# Patient Record
Sex: MALE | Race: WHITE | Employment: OTHER | URBAN - NONMETROPOLITAN AREA
[De-identification: names, ages, dates, MRNs, and addresses within clinical notes are randomized per-mention and may not be internally consistent; named-entity substitution may affect disease eponyms.]

---

## 2021-07-29 LAB
ALBUMIN SERPL-MCNC: 3.9 G/DL
ALP BLD-CCNC: 41 U/L
ALT SERPL-CCNC: 43 U/L
ANION GAP SERPL CALCULATED.3IONS-SCNC: NORMAL MMOL/L
AST SERPL-CCNC: 36 U/L
BASOPHILS ABSOLUTE: NORMAL
BASOPHILS RELATIVE PERCENT: NORMAL
BILIRUB SERPL-MCNC: 0.5 MG/DL (ref 0.1–1.4)
BUN BLDV-MCNC: 26 MG/DL
CALCIUM SERPL-MCNC: 9 MG/DL
CHLORIDE BLD-SCNC: 103 MMOL/L
CHOLESTEROL, TOTAL: 159 MG/DL
CHOLESTEROL/HDL RATIO: NORMAL
CO2: 24 MMOL/L
CREAT SERPL-MCNC: 1.4 MG/DL
EOSINOPHILS ABSOLUTE: NORMAL
EOSINOPHILS RELATIVE PERCENT: NORMAL
GFR CALCULATED: 53
GLUCOSE BLD-MCNC: 91 MG/DL
HCT VFR BLD CALC: 47.8 % (ref 41–53)
HDLC SERPL-MCNC: NORMAL MG/DL
HEMOGLOBIN: 16 G/DL (ref 13.5–17.5)
LDL CHOLESTEROL CALCULATED: 97 MG/DL (ref 0–160)
LYMPHOCYTES ABSOLUTE: NORMAL
LYMPHOCYTES RELATIVE PERCENT: NORMAL
MCH RBC QN AUTO: NORMAL PG
MCHC RBC AUTO-ENTMCNC: NORMAL G/DL
MCV RBC AUTO: NORMAL FL
MONOCYTES ABSOLUTE: NORMAL
MONOCYTES RELATIVE PERCENT: NORMAL
NEUTROPHILS ABSOLUTE: NORMAL
NEUTROPHILS RELATIVE PERCENT: NORMAL
NONHDLC SERPL-MCNC: NORMAL MG/DL
PLATELET # BLD: 231 K/ΜL
PMV BLD AUTO: NORMAL FL
POTASSIUM SERPL-SCNC: 4.3 MMOL/L
RBC # BLD: 5.26 10^6/ΜL
SODIUM BLD-SCNC: 137 MMOL/L
TOTAL PROTEIN: NORMAL
TRIGL SERPL-MCNC: 177 MG/DL
VLDLC SERPL CALC-MCNC: 35 MG/DL
WBC # BLD: 5.12 10^3/ML

## 2022-01-05 RX ORDER — FINASTERIDE 5 MG/1
5 TABLET, FILM COATED ORAL DAILY
COMMUNITY
Start: 2021-12-22

## 2022-05-06 ENCOUNTER — TELEPHONE (OUTPATIENT)
Dept: NEPHROLOGY | Age: 77
End: 2022-05-06

## 2022-05-06 ENCOUNTER — OFFICE VISIT (OUTPATIENT)
Dept: NEPHROLOGY | Age: 77
End: 2022-05-06
Payer: COMMERCIAL

## 2022-05-06 VITALS
SYSTOLIC BLOOD PRESSURE: 148 MMHG | WEIGHT: 175.5 LBS | HEART RATE: 97 BPM | DIASTOLIC BLOOD PRESSURE: 84 MMHG | OXYGEN SATURATION: 98 %

## 2022-05-06 DIAGNOSIS — N18.32 STAGE 3B CHRONIC KIDNEY DISEASE (HCC): Primary | ICD-10-CM

## 2022-05-06 DIAGNOSIS — I10 PRIMARY HYPERTENSION: ICD-10-CM

## 2022-05-06 PROCEDURE — 99203 OFFICE O/P NEW LOW 30 MIN: CPT | Performed by: INTERNAL MEDICINE

## 2022-05-06 RX ORDER — ACETAMINOPHEN 325 MG/1
650 TABLET ORAL 2 TIMES DAILY
COMMUNITY

## 2022-05-06 RX ORDER — GLUCOSAMINE SULFATE DIPOT CHLR 1000 MG
TABLET ORAL
COMMUNITY

## 2022-05-06 RX ORDER — DIMENHYDRINATE 50 MG
1000 TABLET ORAL DAILY
COMMUNITY

## 2022-05-06 RX ORDER — M-VIT,TX,IRON,MINS/CALC/FOLIC 27MG-0.4MG
1 TABLET ORAL DAILY
COMMUNITY

## 2022-05-06 RX ORDER — ANTIARTHRITIC COMBINATION NO.2 900 MG
2 TABLET ORAL DAILY
COMMUNITY

## 2022-05-06 RX ORDER — ASCORBIC ACID 500 MG
1000 TABLET ORAL DAILY
COMMUNITY

## 2022-05-06 RX ORDER — ACETAMINOPHEN/DIPHENHYDRAMINE 500MG-25MG
1 TABLET ORAL NIGHTLY
COMMUNITY

## 2022-05-06 NOTE — PROGRESS NOTES
Nephrology Consult Note  Patient's Name: Darlene Schroeder  10:23 AM  5/6/2022    Nephrologist: Sandra Minaya    Reason for Consult: Elevated serum creatinine level  Requesting Physician:  No att. providers found  PCP: Jeanna Theodore MD    Chief Complaint:  None  Assessment   Diagnosis Orders   1. Stage 3b chronic kidney disease (HCC)  Vitamin D 25 Hydroxy    PTH, Intact    Basic Metabolic Panel    US RENAL LIMITED    Protein / creatinine ratio, urine    Kappa / lambda light chains, urine, 24 hour   2. Primary hypertension           Plan    1. I discussed my with the patient and spouse. 2. They understood. 3. I had [place] questions. 4. I am not certain what it is gentleman has a high serum creatinine level. Discussed his blood pressure was also went on hypertensive nephrosclerosis. 5. Obstructive uropathy remains in the differential diagnosis because of his age. 6. Other consideration would include monoclonal gammopathy which is unlikely. 7. We will check random urine protein creatinine ratio  8. Check markers of chronicity  9. Kidney ultrasound to rule out hydronephrosis  10. Avoid nonsteroidal anti-inflammatory drugs and other nephrotoxic agents  11. List provided to the patient and spouse  15. I advised him to monitor his blood pressure at home twice a day mornings and evening for 5 days and call the office with a report. 13. Return visit in 4 weeks with labs      History Obtained From: Patient, spouse and electronic medical record  History of Present Ilness:    Darlene Schroeder is a 69 y.o. male with with history of a cataract, hernia with previous retinal surgery was referred to my office because of elevated serum creatinine of 1.4 mg/dL. However this was in July 2021. No previous labs. No recent labs either. He recently moved from the [facility] [place] to the [place] [facility] [place]. He never saw a nephrologist in [place]. So it is not certain whether he really has a chronic kidney disease or not.   He monitors his blood pressure at home. Running in the good range according to him. He is not on any blood pressure medicine. Blood pressure slightly here today in the office. No difficulties with urination. [place] No chest pain. No shortness of breath. No nausea or vomiting. No headaches. No abdominal pain. No fever or chills. He admits to hesitancy sometimes. No past medical history on file. Past Surgical History:   Procedure Laterality Date    CATARACT REMOVAL      HERNIA REPAIR         Family History   Problem Relation Age of Onset    Diabetes Brother     Hypertension Brother     Cancer Brother     Parkinson's Disease Brother     Cancer Maternal Aunt         reports that he has quit smoking. He has never used smokeless tobacco. He reports previous alcohol use. He reports that he does not use drugs. Allergies:  Patient has no known allergies. Current Medications:    Current Outpatient Medications   Medication Sig Dispense Refill    Multiple Vitamins-Minerals (THERAPEUTIC MULTIVITAMIN-MINERALS) tablet Take 1 tablet by mouth daily      vitamin C (ASCORBIC ACID) 500 MG tablet Take 1,000 mg by mouth daily      Flaxseed, Linseed, (FLAX SEED OIL) 1000 MG CAPS Take 1,000 mg by mouth daily      Methylsulfonylmethane (MSM) 1000 MG TABS Take by mouth      Biotin 5000 MCG TABS Take 2 tablets by mouth daily      acetaminophen (TYLENOL) 325 MG tablet Take 650 mg by mouth in the morning and at bedtime      diphenhydrAMINE-APAP, sleep, (TYLENOL PM EXTRA STRENGTH)  MG tablet Take 1 tablet by mouth at bedtime      finasteride (PROSCAR) 5 MG tablet Take 5 mg by mouth daily        No current facility-administered medications for this visit. No current facility-administered medications for this visit. Review of Systems:   Review of Systems   Pertinent positives stated above in HPI. All other systems were reviewed and were negative.   ROS: As in HPI    Physical exam:   Physical Exam   Constitutional: Well-developed elderly gentleman in no distress  Vitals:  Vitals:    05/06/22 1013   BP: (!) 148/84   Pulse: 97   SpO2: 98%        Skin: no rash, turgor is normal  Heent: Pupils are reactive to light. Throat is clear. Oral mucosa is moist.  Neck: no bruits or jvd noted   Cardiovascular:  S1, S2 without murmur   Respiratory: Clear with no wheezes,rhonchi or rales   Abdomen: soft,non tender,good bowel sound and no palpable mass  Ext: No lower extremity edema  Psychiatric: mood and affect appropriate  Musculoskeletal:  Rom, muscular strength intact   CNS: Very awake and very alert. Well-oriented. Normal speech. Good motor strength. No obvious focal deficit    Data:   Labs:  Lab Results   Component Value Date     07/29/2021    K 4.3 07/29/2021     07/29/2021    CO2 24 07/29/2021    CREATININE 1.4 07/29/2021    BUN 26 07/29/2021    GLUCOSE 91 07/29/2021    WBC 5.12 07/29/2021    HGB 16.0 07/29/2021    HCT 47.8 07/29/2021     07/29/2021     {Labs reviewed    Imaging:  CXR results: Thank you Dr. Zahra Telles MD for allowing us to participate in care of Nohelia Jesus   **This report has been created using voice recognition software. It maycontain minor  errors which are inherent in voice recognition technology. **    Electronically signed by Soo Soto MD on 5/6/2022 at 10:23 AM

## 2022-05-06 NOTE — PATIENT INSTRUCTIONS
Drugs to Avoid with Chronic Kidney Disease    Non-steroidal anti-inflammatory drugs (NSAIDS)    Potential complication and side effects of NSAIDS include:   Kidney injury and worsening kidney function    Risk of stomach ulcer and intestinal bleeding   Fluid retention and edema   Increased Blood Pressure    Non-Steroidal Anti-Inflammatory Drugs     Celecoxib (Celebrex)   Choline and magnesium salicylates (CMT, Trisosal, Trilisate)   Choline salicylate (Arthropan)   Diclofenac (Voltaren, Arthrotec, Cataflam, Cambia, Voltaren-XR, Zipsor)   Diflunisal (Dolobid)   Etodolac (Lodine XL, Lodine)   Famotidine + Ibuprofen (Duexis)   Fenoprofen (Nalfon, Nalfon 200)   Furbiprofen (Asaid)   Ibuprofen (Advil, Motrin, Midol, Nuprin, Genpril, Dolgesic,Profen,, Vicoprofen,Combunox, Actiprofen, Addaprin, Caldolor, Haltran, Q-Profen,Ibren, Menadol, Rufen,Saleto-200, John,Ultraprin, Uni-Pro,Wal-Profen)   Indomethacin (Indocin, Indomethegan, Indo-Holland)    Ketoprofen (Orudis  KT, Oruvail, Actron)   Ketorolac (Toradol, Sprix)   Magnesium Sulfate (Arthritab, Liana Select, Dharmesh's pills, Yanick, Mobidin, Mobogesic)   Meclofenamate Sodium (Ponstel)   Meloxicam (Mobic)   Naproxen (Aleve, Anaprox, Naprosyn, EC-Naprosyn, Naprelan, All Day Pain Relief, Aflaxen, Anaprox-DS, Midol Extended Relief, Naprelan,Prevacid NapraPac, Naprapac, Vimovo)   Nabumetone (Relafen)   Oxaprozin (Daypro)   Piroxicam (Feldene)   Rofecoxib (Vioxx)   Salsalate (Amigesic, Anaflex 750, Disalcid, Marthritic, Mono-gesic, Salflex, Salsitab)   Sodium salicylate (various generics)   Sulindac (Clinoril)   Tolmetin (Tolectin)   Valdecoxib (Bextra)   Mefenamic Acid (Ponstel)   Famotidine and Ibuprofen (Duexis) but not famotidine by itself   Meclofenamate (Meclomen)    Antibiotics   Bactrim   Gentamycin   Tobramycin   Vancomycin   Tetracycline   Macrobid     Other                  IV contrast dye

## 2022-05-10 ENCOUNTER — TELEPHONE (OUTPATIENT)
Dept: NEPHROLOGY | Age: 77
End: 2022-05-10

## 2022-05-10 DIAGNOSIS — N18.32 STAGE 3B CHRONIC KIDNEY DISEASE (HCC): ICD-10-CM

## 2022-05-10 NOTE — TELEPHONE ENCOUNTER
----- Message from Alexandria Portillo MD sent at 5/10/2022 12:23 PM EDT -----  Kidney ultrasound is normal

## 2022-05-26 LAB
BUN BLDV-MCNC: 20 MG/DL
CALCIUM SERPL-MCNC: 9.4 MG/DL
CHLORIDE BLD-SCNC: 105 MMOL/L
CO2: 26 MMOL/L
CREAT SERPL-MCNC: 1.4 MG/DL
GFR CALCULATED: 52
GLUCOSE BLD-MCNC: 97 MG/DL
POTASSIUM SERPL-SCNC: 4 MMOL/L
PTH INTACT: 17.6
SODIUM BLD-SCNC: 140 MMOL/L
VITAMIN D 25-HYDROXY: 150
VITAMIN D2, 25 HYDROXY: NORMAL
VITAMIN D3,25 HYDROXY: NORMAL

## 2022-06-03 ENCOUNTER — OFFICE VISIT (OUTPATIENT)
Dept: NEPHROLOGY | Age: 77
End: 2022-06-03
Payer: COMMERCIAL

## 2022-06-03 VITALS
DIASTOLIC BLOOD PRESSURE: 92 MMHG | OXYGEN SATURATION: 97 % | SYSTOLIC BLOOD PRESSURE: 178 MMHG | WEIGHT: 172.4 LBS | HEART RATE: 77 BPM

## 2022-06-03 DIAGNOSIS — R79.89 HIGH SERUM VITAMIN D: ICD-10-CM

## 2022-06-03 DIAGNOSIS — N18.32 STAGE 3B CHRONIC KIDNEY DISEASE (HCC): Primary | ICD-10-CM

## 2022-06-03 PROCEDURE — 1123F ACP DISCUSS/DSCN MKR DOCD: CPT | Performed by: INTERNAL MEDICINE

## 2022-06-03 PROCEDURE — 99214 OFFICE O/P EST MOD 30 MIN: CPT | Performed by: INTERNAL MEDICINE

## 2022-06-03 NOTE — PROGRESS NOTES
Renal Progress Note    Assessment and Plan:      Diagnosis Orders   1. Stage 3b chronic kidney disease ([facility] [address]     2. High serum vitamin D                 PLAN:  1. Lab results reviewed with the patient. 2. Serum creatinine remains stable at 1.4 mg/dL. 3. Vitamin D level is high at 150.  4. PTH is slightly low at 17.6.  5. Kidney ultrasound shows a right renal cyst 8 mm but otherwise no hydronephrosis. 6. Random urine protein creatinine ratio is normal at 0.1 g  7. Kappa with lambda free light chain assay ratio is normal  8. Stop oral vitamin D  9. Return visit in 6 months          There is no problem list on file for this patient. Subjective:   Chief complaint:  Chief Complaint   Patient presents with    Chronic Kidney Disease     Stage IIIa      HPI:This is a follow up visit for Mr. Blanca Pino who is here today for return appointment. I saw him about 4 weeks ago for elevated serum creatinine level. Etiology of that was not certain with absence of diabetes mellitus or hypertension. He has been doing well since then. No complaint today. No chest pain. No shortness of breath. No nausea or vomiting. No fever or chills. No headaches. ROS:  Pertinent positives stated above in HPI. All other systems were reviewed and were negative.   Medications:     Current Outpatient Medications   Medication Sig Dispense Refill    Multiple Vitamins-Minerals (THERAPEUTIC MULTIVITAMIN-MINERALS) tablet Take 1 tablet by mouth daily      vitamin C (ASCORBIC ACID) 500 MG tablet Take 1,000 mg by mouth daily      Flaxseed, Linseed, (FLAX SEED OIL) 1000 MG CAPS Take 1,000 mg by mouth daily      Methylsulfonylmethane (MSM) 1000 MG TABS Take by mouth      Biotin 5000 MCG TABS Take 2 tablets by mouth daily      acetaminophen (TYLENOL) 325 MG tablet Take 650 mg by mouth in the morning and at bedtime      diphenhydrAMINE-APAP, sleep, (TYLENOL PM EXTRA STRENGTH)  MG tablet Take 1 tablet by mouth at bedtime      finasteride (PROSCAR) 5 MG tablet Take 5 mg by mouth daily        No current facility-administered medications for this visit. Lab Results:    CBC:   Lab Results   Component Value Date    WBC 5.12 07/29/2021    HGB 16.0 07/29/2021    HCT 47.8 07/29/2021     07/29/2021     BMP:    Lab Results   Component Value Date     05/26/2022     07/29/2021    K 4.0 05/26/2022    K 4.3 07/29/2021     05/26/2022     07/29/2021    CO2 26 05/26/2022    CO2 24 07/29/2021    BUN 20 05/26/2022    BUN 26 07/29/2021    CREATININE 1.4 05/26/2022    CREATININE 1.4 07/29/2021    GLUCOSE 97 05/26/2022    GLUCOSE 91 07/29/2021      Hepatic:   Lab Results   Component Value Date    AST 36 07/29/2021    ALT 43 07/29/2021    BILITOT 0.5 07/29/2021    ALKPHOS 41 07/29/2021     BNP: No results found for: BNP  Lipids:   Lab Results   Component Value Date    CHOL 159 07/29/2021     INR: No results found for: INR  URINE: No results found for: NAUR, PROTUR  No results found for: NITRU, COLORU, PHUR, LABCAST, WBCUA, RBCUA, MUCUS, TRICHOMONAS, YEAST, BACTERIA, CLARITYU, SPECGRAV, LEUKOCYTESUR, UROBILINOGEN, BILIRUBINUR, BLOODU, GLUCOSEU, KETUA, AMORPHOUS   Microalbumen/Creatinine ratio:  No components found for: RUCREAT    Objective:   Vitals: BP (!) 178/92 (Site: Right Upper Arm, Position: Sitting, Cuff Size: Large Adult)   Pulse 77   Wt 172 lb 6.4 oz (78.2 kg)   SpO2 97%      Constitutional:  Alert, awake, no apparent distress  Skin:normal with no rash or any significant lesions  HEENT:Pupils are reactive . Throat is clear. Oral mucosa is moist.  Neck:supple with no thyromegaly, JVD, lymphadenopathy or bruit   Cardiovascular: Regular sinus rhythm without murmur, rubs or gallops   Respiratory:  Clear to auscultation with no wheezes or rales  Abdomen: Good bowel sound, soft, non tender and no bruit  Ext: No LE edema  Musculoskeletal:Intact  Neuro:Alert, awake and oriented with no obvious focal deficit.   Speech is normal.    Electronically signed by Chelsea Melo MD on 6/3/2022 at 9:24 AM   **This report has been created using voice recognition software. It maycontain minor  errors which are inherent in voice recognition technology. **